# Patient Record
Sex: FEMALE | Race: WHITE | NOT HISPANIC OR LATINO | Employment: UNEMPLOYED | RURAL
[De-identification: names, ages, dates, MRNs, and addresses within clinical notes are randomized per-mention and may not be internally consistent; named-entity substitution may affect disease eponyms.]

---

## 2017-01-12 ENCOUNTER — OFFICE VISIT (OUTPATIENT)
Dept: RETAIL CLINIC | Facility: CLINIC | Age: 32
End: 2017-01-12

## 2017-01-12 VITALS
RESPIRATION RATE: 16 BRPM | WEIGHT: 161 LBS | HEIGHT: 67 IN | TEMPERATURE: 98.3 F | OXYGEN SATURATION: 97 % | SYSTOLIC BLOOD PRESSURE: 100 MMHG | DIASTOLIC BLOOD PRESSURE: 60 MMHG | BODY MASS INDEX: 25.27 KG/M2 | HEART RATE: 87 BPM

## 2017-01-12 DIAGNOSIS — J06.9 ACUTE URI: Primary | ICD-10-CM

## 2017-01-12 DIAGNOSIS — R06.2 WHEEZING: ICD-10-CM

## 2017-01-12 PROCEDURE — 99213 OFFICE O/P EST LOW 20 MIN: CPT | Performed by: NURSE PRACTITIONER

## 2017-01-12 RX ORDER — ALBUTEROL SULFATE 90 UG/1
2 AEROSOL, METERED RESPIRATORY (INHALATION) EVERY 6 HOURS PRN
Qty: 18 G | Refills: 0 | Status: SHIPPED | OUTPATIENT
Start: 2017-01-12

## 2017-01-12 NOTE — PROGRESS NOTES
"Subjective   Ameriac Maru Pride is a 31 y.o. female.     Wheezing    This is a chronic problem. Episode onset: has had cold symptoms x 1 week and wheezing has gotten worse. The problem occurs 2 to 4 times per day. The problem has been gradually worsening. Associated symptoms include coughing, rhinorrhea and shortness of breath. Pertinent negatives include no abdominal pain, chest pain, chills, coryza, diarrhea, ear pain, fever, headaches, hemoptysis, neck pain, rash, sore throat, sputum production, swollen glands or vomiting. The symptoms are aggravated by URIs. She has tried nothing (hx of asthma, but out of albuterol for some time.  Has appt for f/u with Dr. Cook next week) for the symptoms. Her past medical history is significant for asthma and COPD. smoker--quit last week        The following portions of the patient's history were reviewed and updated as appropriate: allergies, current medications, past medical history and past social history.    Review of Systems   Constitutional: Negative for activity change, appetite change, chills and fever.   HENT: Positive for congestion, postnasal drip, rhinorrhea and sneezing. Negative for ear pain, sinus pressure, sore throat and trouble swallowing.    Eyes: Negative.    Respiratory: Positive for cough, chest tightness, shortness of breath and wheezing. Negative for hemoptysis and sputum production.    Cardiovascular: Negative.  Negative for chest pain.   Gastrointestinal: Negative for abdominal pain, diarrhea, nausea and vomiting.   Musculoskeletal: Negative for myalgias, neck pain and neck stiffness.   Skin: Negative.  Negative for rash.   Neurological: Negative for dizziness and headaches.   Hematological: Negative for adenopathy.   Psychiatric/Behavioral: Negative.        Objective    Visit Vitals   • /60   • Pulse 87   • Temp 98.3 °F (36.8 °C) (Tympanic)   • Resp 16   • Ht 67\" (170.2 cm)   • Wt 161 lb (73 kg)   • SpO2 97%   • Breastfeeding No "   • BMI 25.22 kg/m2       Physical Exam   Constitutional: She is oriented to person, place, and time. She appears well-developed. No distress.   HENT:   Head: Normocephalic and atraumatic.   Right Ear: Tympanic membrane and ear canal normal.   Left Ear: Tympanic membrane and ear canal normal.   Nose: Mucosal edema ( mildly injected) present. Right sinus exhibits no maxillary sinus tenderness and no frontal sinus tenderness. Left sinus exhibits no maxillary sinus tenderness and no frontal sinus tenderness.   Mouth/Throat: Uvula is midline and mucous membranes are normal. Posterior oropharyngeal erythema ( mildly injected with clear PND) present.   Eyes: Conjunctivae are normal.   Neck: Normal range of motion. Neck supple.   Cardiovascular: Normal rate and regular rhythm.    Pulmonary/Chest: Effort normal. She has wheezes ( decreased aeration with inspiratory/expiratory wheezing).   Lymphadenopathy:     She has no cervical adenopathy.   Neurological: She is alert and oriented to person, place, and time.   Psychiatric: She has a normal mood and affect. Her behavior is normal.   Nursing note and vitals reviewed.      Assessment/Plan   America was seen today for med refill.    Diagnoses and all orders for this visit:    Acute URI    Wheezing  -     albuterol (PROVENTIL HFA;VENTOLIN HFA) 108 (90 BASE) MCG/ACT inhaler; Inhale 2 puffs Every 6 (Six) Hours As Needed for wheezing.    Push fluids  Rest  OTC cold meds as needed    Pulmonology as scheduled next week

## 2017-01-12 NOTE — PATIENT INSTRUCTIONS
Bronchospasm, Adult  A bronchospasm is when the tubes that carry air in and out of your lungs (airways) spasm or tighten. During a bronchospasm it is hard to breathe. This is because the airways get smaller. A bronchospasm can be triggered by:  · Allergies. These may be to animals, pollen, food, or mold.  · Infection. This is a common cause of bronchospasm.  · Exercise.  · Irritants. These include pollution, cigarette smoke, strong odors, aerosol sprays, and paint fumes.  · Weather changes.  · Stress.  · Being emotional.  HOME CARE   · Always have a plan for getting help. Know when to call your doctor and local emergency services (911 in the [place] Know where you can get emergency care.  · Only take medicines as told by your doctor.  · If you were prescribed an inhaler or nebulizer machine, ask your doctor how to use it correctly. Always use a spacer with your inhaler if you were given one.  · Stay calm during an attack. Try to relax and breathe more slowly.  · Control your home environment:    Change your heating and air conditioning filter at least once a month.    Limit your use of fireplaces and wood stoves.    Do not  smoke. Do not  allow smoking in your home.    Avoid perfumes and fragrances.    Get rid of pests (such as roaches and mice) and their droppings.    Throw away plants if you see mold on them.    Keep your house clean and dust free.    Replace carpet with wood, tile, or vinyl cheng. Carpet can trap dander and dust.    Use allergy-proof pillows, mattress covers, and box spring covers.    Wash bed sheets and blankets every week in hot water. Dry them in a dryer.    Use blankets that are made of polyester or cotton.    Wash hands frequently.  GET HELP IF:  · You have muscle aches.  · You have chest pain.  · The thick spit you spit or cough up (sputum) changes from clear or white to yellow, green, gray, or bloody.  · The thick spit you spit or cough up gets thicker.  · There are problems that may be  "related to the medicine you are given such as:    A rash.    Itching.    Swelling.    Trouble breathing.  GET HELP RIGHT AWAY IF:  · You feel you cannot breathe or catch your breath.  · You cannot stop coughing.  · Your treatment is not helping you breathe better.  · You have very bad chest pain.  MAKE SURE YOU:   · Understand these instructions.  · Will watch your condition.  · Will get help right away if you are not doing well or get worse.     This information is not intended to replace advice given to you by your health care provider. Make sure you discuss any questions you have with your health care provider.     Document Released: 10/15/2010 Document Revised: 01/08/2016 Document Reviewed: 06/10/2014  [employer] Interactive Patient Education ©2016 [employer]  Upper Respiratory Infection, Adult  Most upper respiratory infections (URIs) are a viral infection of the air passages leading to the lungs. A URI affects the nose, throat, and upper air passages. The most common type of URI is nasopharyngitis and is typically referred to as \"the common cold.\"  URIs run their course and usually go away on their own. Most of the time, a URI does not require medical attention, but sometimes a bacterial infection in the upper airways can follow a viral infection. This is called a secondary infection. Sinus and middle ear infections are common types of secondary upper respiratory infections.  Bacterial pneumonia can also complicate a URI. A URI can worsen asthma and chronic obstructive pulmonary disease (COPD). Sometimes, these complications can require emergency medical care and may be life threatening.   CAUSES  Almost all URIs are caused by viruses. A virus is a type of germ and can spread from one person to another.   RISKS FACTORS  You may be at risk for a URI if:   · You smoke.    · You have chronic heart or lung disease.  · You have a weakened defense (immune) system.    · You are very young or very old.    · You have " nasal allergies or asthma.  · You work in crowded or poorly ventilated areas.  · You work in health care facilities or schools.  SIGNS AND SYMPTOMS   Symptoms typically develop 2-3 days after you come in contact with a cold virus. Most viral URIs last 7-10 days. However, viral URIs from the influenza virus (flu virus) can last 14-18 days and are typically more severe. Symptoms may include:   · Runny or stuffy (congested) nose.    · Sneezing.    · Cough.    · Sore throat.    · Headache.    · Fatigue.    · Fever.    · Loss of appetite.    · Pain in your forehead, behind your eyes, and over your cheekbones (sinus pain).  · Muscle aches.    DIAGNOSIS   Your health care provider may diagnose a URI by:  · Physical exam.  · Tests to check that your symptoms are not due to another condition such as:  ¨ Strep throat.  ¨ Sinusitis.  ¨ Pneumonia.  ¨ Asthma.  TREATMENT   A URI goes away on its own with time. It cannot be cured with medicines, but medicines may be prescribed or recommended to relieve symptoms. Medicines may help:  · Reduce your fever.  · Reduce your cough.  · Relieve nasal congestion.  HOME CARE INSTRUCTIONS   · Take medicines only as directed by your health care provider.    · Gargle warm saltwater or take cough drops to comfort your throat as directed by your health care provider.  · Use a warm mist humidifier or inhale steam from a shower to increase air moisture. This may make it easier to breathe.  · Drink enough fluid to keep your urine clear or pale yellow.    · Eat soups and other clear broths and maintain good nutrition.    · Rest as needed.    · Return to work when your temperature has returned to normal or as your health care provider advises. You may need to stay home longer to avoid infecting others. You can also use a face mask and careful hand washing to prevent spread of the virus.  · Increase the usage of your inhaler if you have asthma.    · Do not use any tobacco products, including  cigarettes, chewing tobacco, or electronic cigarettes. If you need help quitting, ask your health care provider.  PREVENTION   The best way to protect yourself from getting a cold is to practice good hygiene.   · Avoid oral or hand contact with people with cold symptoms.    · Wash your hands often if contact occurs.    There is no clear evidence that vitamin C, vitamin E, echinacea, or exercise reduces the chance of developing a cold. However, it is always recommended to get plenty of rest, exercise, and practice good nutrition.   SEEK MEDICAL CARE IF:   · You are getting worse rather than better.    · Your symptoms are not controlled by medicine.    · You have chills.  · You have worsening shortness of breath.  · You have brown or red mucus.  · You have yellow or brown nasal discharge.  · You have pain in your face, especially when you bend forward.  · You have a fever.  · You have swollen neck glands.  · You have pain while swallowing.  · You have white areas in the back of your throat.  SEEK IMMEDIATE MEDICAL CARE IF:   · You have severe or persistent:    Headache.    Ear pain.    Sinus pain.    Chest pain.  · You have chronic lung disease and any of the following:    Wheezing.    Prolonged cough.    Coughing up blood.    A change in your usual mucus.  · You have a stiff neck.  · You have changes in your:    Vision.    Hearing.    Thinking.    Mood.  MAKE SURE YOU:   · Understand these instructions.  · Will watch your condition.  · Will get help right away if you are not doing well or get worse.     This information is not intended to replace advice given to you by your health care provider. Make sure you discuss any questions you have with your health care provider.     Document Released: 06/13/2002 Document Revised: 05/03/2016 Document Reviewed: 03/25/2015  [employer] Interactive Patient Education ©2016 [employer]

## 2017-01-12 NOTE — MR AVS SNAPSHOT
America Pride   1/12/2017 12:00 PM   Office Visit    Dept Phone:  [phone]   Encounter #:  [id number]    Provider:  AXEL BRIGGS [place]   Department:  [facility]                Your Full Care Plan              Today's Medication Changes          These changes are accurate as of: 1/12/17  1:03 PM.  If you have any questions, ask your nurse or doctor.               New Medication(s)Ordered:     albuterol 108 (90 BASE) MCG/ACT inhaler   Commonly known as:  PROVENTIL HFA;VENTOLIN HFA   Inhale 2 puffs Every 6 (Six) Hours As Needed for wheezing.            Where to Get Your Medications      These medications were sent to [facility] - [place], [place] - [address] [place] [address] - [phone]  - [phone] [address], [facility] [address]     Phone:  [phone]     albuterol 108 (90 BASE) MCG/ACT inhaler                  Your Updated Medication List          This list is accurate as of: 1/12/17  1:03 PM.  Always use your most recent med list.                albuterol 108 (90 BASE) MCG/ACT inhaler   Commonly known as:  PROVENTIL HFA;VENTOLIN HFA   Inhale 2 puffs Every 6 (Six) Hours As Needed for wheezing.       NEURONTIN PO       SEROQUEL PO       STRATTERA PO               You Were Diagnosed With        Codes Comments    Acute URI    -  Primary ICD-10-CM: J06.9  ICD-9-CM: 465.9     Wheezing     ICD-10-CM: R06.2  ICD-9-CM: 786.07       Instructions    Bronchospasm, Adult  A bronchospasm is when the tubes that carry air in and out of your lungs (airways) spasm or tighten. During a bronchospasm it is hard to breathe. This is because the airways get smaller. A bronchospasm can be triggered by:  · Allergies. These may be to animals, pollen, food, or mold.  · Infection. This is a common cause of bronchospasm.  · Exercise.  · Irritants. These include pollution, cigarette smoke, strong odors, aerosol sprays, and paint  fumes.  · Weather changes.  · Stress.  · Being emotional.  HOME CARE   · Always have a plan for getting help. Know when to call your doctor and local emergency services (911 in the [place] Know where you can get emergency care.  · Only take medicines as told by your doctor.  · If you were prescribed an inhaler or nebulizer machine, ask your doctor how to use it correctly. Always use a spacer with your inhaler if you were given one.  · Stay calm during an attack. Try to relax and breathe more slowly.  · Control your home environment:    Change your heating and air conditioning filter at least once a month.    Limit your use of fireplaces and wood stoves.    Do not  smoke. Do not  allow smoking in your home.    Avoid perfumes and fragrances.    Get rid of pests (such as roaches and mice) and their droppings.    Throw away plants if you see mold on them.    Keep your house clean and dust free.    Replace carpet with wood, tile, or vinyl cheng. Carpet can trap dander and dust.    Use allergy-proof pillows, mattress covers, and box spring covers.    Wash bed sheets and blankets every week in hot water. Dry them in a dryer.    Use blankets that are made of polyester or cotton.    Wash hands frequently.  GET HELP IF:  · You have muscle aches.  · You have chest pain.  · The thick spit you spit or cough up (sputum) changes from clear or white to yellow, green, gray, or bloody.  · The thick spit you spit or cough up gets thicker.  · There are problems that may be related to the medicine you are given such as:    A rash.    Itching.    Swelling.    Trouble breathing.  GET HELP RIGHT AWAY IF:  · You feel you cannot breathe or catch your breath.  · You cannot stop coughing.  · Your treatment is not helping you breathe better.  · You have very bad chest pain.  MAKE SURE YOU:   · Understand these instructions.  · Will watch your condition.  · Will get help right away if you are not doing well or get worse.     This information  "is not intended to replace advice given to you by your health care provider. Make sure you discuss any questions you have with your health care provider.     Document Released: 10/15/2010 Document Revised: 01/08/2016 Document Reviewed: 06/10/2014  [employer] Interactive Patient Education ©2016 [employer]  Upper Respiratory Infection, Adult  Most upper respiratory infections (URIs) are a viral infection of the air passages leading to the lungs. A URI affects the nose, throat, and upper air passages. The most common type of URI is nasopharyngitis and is typically referred to as \"the common cold.\"  URIs run their course and usually go away on their own. Most of the time, a URI does not require medical attention, but sometimes a bacterial infection in the upper airways can follow a viral infection. This is called a secondary infection. Sinus and middle ear infections are common types of secondary upper respiratory infections.  Bacterial pneumonia can also complicate a URI. A URI can worsen asthma and chronic obstructive pulmonary disease (COPD). Sometimes, these complications can require emergency medical care and may be life threatening.   CAUSES  Almost all URIs are caused by viruses. A virus is a type of germ and can spread from one person to another.   RISKS FACTORS  You may be at risk for a URI if:   · You smoke.    · You have chronic heart or lung disease.  · You have a weakened defense (immune) system.    · You are very young or very old.    · You have nasal allergies or asthma.  · You work in crowded or poorly ventilated areas.  · You work in health care facilities or schools.  SIGNS AND SYMPTOMS   Symptoms typically develop 2-3 days after you come in contact with a cold virus. Most viral URIs last 7-10 days. However, viral URIs from the influenza virus (flu virus) can last 14-18 days and are typically more severe. Symptoms may include:   · Runny or stuffy (congested) nose.    · Sneezing.    · Cough.    · Sore " throat.    · Headache.    · Fatigue.    · Fever.    · Loss of appetite.    · Pain in your forehead, behind your eyes, and over your cheekbones (sinus pain).  · Muscle aches.    DIAGNOSIS   Your health care provider may diagnose a URI by:  · Physical exam.  · Tests to check that your symptoms are not due to another condition such as:  ¨ Strep throat.  ¨ Sinusitis.  ¨ Pneumonia.  ¨ Asthma.  TREATMENT   A URI goes away on its own with time. It cannot be cured with medicines, but medicines may be prescribed or recommended to relieve symptoms. Medicines may help:  · Reduce your fever.  · Reduce your cough.  · Relieve nasal congestion.  HOME CARE INSTRUCTIONS   · Take medicines only as directed by your health care provider.    · Gargle warm saltwater or take cough drops to comfort your throat as directed by your health care provider.  · Use a warm mist humidifier or inhale steam from a shower to increase air moisture. This may make it easier to breathe.  · Drink enough fluid to keep your urine clear or pale yellow.    · Eat soups and other clear broths and maintain good nutrition.    · Rest as needed.    · Return to work when your temperature has returned to normal or as your health care provider advises. You may need to stay home longer to avoid infecting others. You can also use a face mask and careful hand washing to prevent spread of the virus.  · Increase the usage of your inhaler if you have asthma.    · Do not use any tobacco products, including cigarettes, chewing tobacco, or electronic cigarettes. If you need help quitting, ask your health care provider.  PREVENTION   The best way to protect yourself from getting a cold is to practice good hygiene.   · Avoid oral or hand contact with people with cold symptoms.    · Wash your hands often if contact occurs.    There is no clear evidence that vitamin C, vitamin E, echinacea, or exercise reduces the chance of developing a cold. However, it is always recommended to  get plenty of rest, exercise, and practice good nutrition.   SEEK MEDICAL CARE IF:   · You are getting worse rather than better.    · Your symptoms are not controlled by medicine.    · You have chills.  · You have worsening shortness of breath.  · You have brown or red mucus.  · You have yellow or brown nasal discharge.  · You have pain in your face, especially when you bend forward.  · You have a fever.  · You have swollen neck glands.  · You have pain while swallowing.  · You have white areas in the back of your throat.  SEEK IMMEDIATE MEDICAL CARE IF:   · You have severe or persistent:    Headache.    Ear pain.    Sinus pain.    Chest pain.  · You have chronic lung disease and any of the following:    Wheezing.    Prolonged cough.    Coughing up blood.    A change in your usual mucus.  · You have a stiff neck.  · You have changes in your:    Vision.    Hearing.    Thinking.    Mood.  MAKE SURE YOU:   · Understand these instructions.  · Will watch your condition.  · Will get help right away if you are not doing well or get worse.     This information is not intended to replace advice given to you by your health care provider. Make sure you discuss any questions you have with your health care provider.     Document Released: 06/13/2002 Document Revised: 05/03/2016 Document Reviewed: 03/25/2015  [employer] Interactive Patient Education ©2016 [employer]       Patient Instructions History      Upcoming Appointments     Visit Type Date Time Department    OFFICE VISIT 1/12/2017 12:00 PM [facility]      [employer] Signup     [facility] [employer] allows you to send messages to your doctor, view your test results, renew your prescriptions, schedule appointments, and more. To sign up, go to [employer] and click on the Sign Up Now link in the New User? box. Enter your [employer] Activation Code exactly as it appears below along with the last four digits of your Social Security Number and your Date of  "Birth () to complete the sign-up process. If you do not sign up before the expiration date, you must request a new code.    [employer] Activation Code: [email or web address]  Expires: 2017  1:03 PM    If you have questions, you can email [email or web address] or call [phone] to talk to our [employer] staff. Remember, [employer] is NOT to be used for urgent needs. For medical emergencies, dial 911.               Other Info from Your Visit           Allergies     No Known Allergies      Reason for Visit     Med Refill skin itchy x 1 week       Vital Signs     Blood Pressure Pulse Temperature Respirations Height Weight    100/60 87 98.3 °F (36.8 °C) (Tympanic) 16 67\" (170.2 cm) 161 lb (73 kg)    Oxygen Saturation Breastfeeding? Body Mass Index Smoking Status          97% No 25.22 kg/m2 Former Smoker        Problems and Diagnoses Noted     Acute upper respiratory infection    -  Primary    Wheezing            "

## 2018-02-08 DIAGNOSIS — R06.2 WHEEZING: ICD-10-CM

## 2018-02-08 RX ORDER — ALBUTEROL SULFATE 90 UG/1
AEROSOL, METERED RESPIRATORY (INHALATION)
Qty: 18 G | Refills: 0 | OUTPATIENT
Start: 2018-02-08

## 2020-12-21 ENCOUNTER — HOSPITAL ENCOUNTER (INPATIENT)
Facility: HOSPITAL | Age: 35
LOS: 4 days | Discharge: HOME | End: 2020-12-25
Attending: PSYCHIATRY & NEUROLOGY | Admitting: PSYCHIATRY & NEUROLOGY

## 2020-12-21 DIAGNOSIS — F90.2 ATTENTION DEFICIT HYPERACTIVITY DISORDER (ADHD), COMBINED TYPE: Primary | ICD-10-CM

## 2020-12-21 PROBLEM — F32.A DEPRESSION: Status: ACTIVE | Noted: 2020-12-21

## 2020-12-21 PROBLEM — F33.3 SEVERE EPISODE OF RECURRENT MAJOR DEPRESSIVE DISORDER, WITH PSYCHOTIC FEATURES (HCC): Status: ACTIVE | Noted: 2020-12-21

## 2020-12-21 PROBLEM — F43.10 POST TRAUMATIC STRESS DISORDER (PTSD): Status: ACTIVE | Noted: 2020-12-21

## 2020-12-21 LAB
CHOLEST SERPL-MCNC: 212 MG/DL (ref 0–200)
GLUCOSE P FAST SERPL-MCNC: 100 MG/DL (ref 74–106)
HDLC SERPL-MCNC: 35 MG/DL (ref 40–60)
LDLC SERPL CALC-MCNC: 159 MG/DL (ref 0–100)
LDLC/HDLC SERPL: 4.5 {RATIO}
TRIGL SERPL-MCNC: 98 MG/DL (ref 0–150)
VLDLC SERPL-MCNC: 18 MG/DL (ref 5–40)

## 2020-12-21 PROCEDURE — 82947 ASSAY GLUCOSE BLOOD QUANT: CPT | Performed by: PSYCHIATRY & NEUROLOGY

## 2020-12-21 PROCEDURE — 99223 1ST HOSP IP/OBS HIGH 75: CPT | Performed by: PSYCHIATRY & NEUROLOGY

## 2020-12-21 PROCEDURE — 93010 ELECTROCARDIOGRAM REPORT: CPT | Performed by: INTERNAL MEDICINE

## 2020-12-21 PROCEDURE — 93005 ELECTROCARDIOGRAM TRACING: CPT | Performed by: PSYCHIATRY & NEUROLOGY

## 2020-12-21 PROCEDURE — 80061 LIPID PANEL: CPT | Performed by: PSYCHIATRY & NEUROLOGY

## 2020-12-21 RX ORDER — TRAZODONE HYDROCHLORIDE 50 MG/1
50 TABLET ORAL NIGHTLY PRN
Status: DISCONTINUED | OUTPATIENT
Start: 2020-12-21 | End: 2020-12-26 | Stop reason: HOSPADM

## 2020-12-21 RX ORDER — TOPIRAMATE 50 MG/1
50 TABLET, FILM COATED ORAL DAILY
COMMUNITY
End: 2020-12-25 | Stop reason: HOSPADM

## 2020-12-21 RX ORDER — DIAZEPAM 5 MG/1
10 TABLET ORAL EVERY 8 HOURS PRN
COMMUNITY
End: 2020-12-25 | Stop reason: HOSPADM

## 2020-12-21 RX ORDER — ACETAMINOPHEN 325 MG/1
650 TABLET ORAL EVERY 4 HOURS PRN
Status: DISCONTINUED | OUTPATIENT
Start: 2020-12-21 | End: 2020-12-26 | Stop reason: HOSPADM

## 2020-12-21 RX ORDER — PRAZOSIN HYDROCHLORIDE 1 MG/1
2 CAPSULE ORAL NIGHTLY
Status: DISCONTINUED | OUTPATIENT
Start: 2020-12-21 | End: 2020-12-23

## 2020-12-21 RX ORDER — IBUPROFEN 800 MG/1
800 TABLET ORAL EVERY 6 HOURS PRN
COMMUNITY
End: 2020-12-25 | Stop reason: HOSPADM

## 2020-12-21 RX ORDER — HYDROXYZINE PAMOATE 50 MG/1
50 CAPSULE ORAL EVERY 6 HOURS PRN
Status: DISCONTINUED | OUTPATIENT
Start: 2020-12-21 | End: 2020-12-26 | Stop reason: HOSPADM

## 2020-12-21 RX ORDER — OLANZAPINE 5 MG/1
10 TABLET, ORALLY DISINTEGRATING ORAL EVERY 8 HOURS PRN
Status: DISCONTINUED | OUTPATIENT
Start: 2020-12-21 | End: 2020-12-21

## 2020-12-21 RX ORDER — BUPROPION HYDROCHLORIDE 150 MG/1
300 TABLET ORAL DAILY
COMMUNITY
End: 2020-12-25 | Stop reason: HOSPADM

## 2020-12-21 RX ORDER — ONDANSETRON 4 MG/1
4 TABLET, FILM COATED ORAL EVERY 6 HOURS PRN
Status: DISCONTINUED | OUTPATIENT
Start: 2020-12-21 | End: 2020-12-26 | Stop reason: HOSPADM

## 2020-12-21 RX ORDER — LOPERAMIDE HYDROCHLORIDE 2 MG/1
2 CAPSULE ORAL
Status: DISCONTINUED | OUTPATIENT
Start: 2020-12-21 | End: 2020-12-26 | Stop reason: HOSPADM

## 2020-12-21 RX ORDER — GUAIFENESIN 600 MG/1
600 TABLET, EXTENDED RELEASE ORAL 2 TIMES DAILY
COMMUNITY
End: 2020-12-25 | Stop reason: HOSPADM

## 2020-12-21 RX ORDER — OLANZAPINE 5 MG/1
10 TABLET, ORALLY DISINTEGRATING ORAL EVERY 8 HOURS PRN
Status: DISCONTINUED | OUTPATIENT
Start: 2020-12-21 | End: 2020-12-26 | Stop reason: HOSPADM

## 2020-12-21 RX ORDER — PRAZOSIN HYDROCHLORIDE 5 MG/1
5 CAPSULE ORAL NIGHTLY
COMMUNITY
End: 2020-12-25 | Stop reason: HOSPADM

## 2020-12-21 RX ORDER — CLONIDINE HYDROCHLORIDE 0.1 MG/1
0.1 TABLET ORAL EVERY 4 HOURS PRN
Status: DISCONTINUED | OUTPATIENT
Start: 2020-12-21 | End: 2020-12-26 | Stop reason: HOSPADM

## 2020-12-21 RX ORDER — ALBUTEROL SULFATE 2.5 MG/3ML
2.5 SOLUTION RESPIRATORY (INHALATION) EVERY 6 HOURS PRN
Status: DISCONTINUED | OUTPATIENT
Start: 2020-12-21 | End: 2020-12-26 | Stop reason: HOSPADM

## 2020-12-21 RX ORDER — TRAZODONE HYDROCHLORIDE 100 MG/1
200 TABLET ORAL NIGHTLY
COMMUNITY
End: 2020-12-25 | Stop reason: HOSPADM

## 2020-12-21 RX ORDER — ALUMINA, MAGNESIA, AND SIMETHICONE 2400; 2400; 240 MG/30ML; MG/30ML; MG/30ML
15 SUSPENSION ORAL EVERY 6 HOURS PRN
Status: DISCONTINUED | OUTPATIENT
Start: 2020-12-21 | End: 2020-12-26 | Stop reason: HOSPADM

## 2020-12-21 RX ORDER — HALOPERIDOL 5 MG/ML
5 INJECTION INTRAMUSCULAR EVERY 8 HOURS PRN
Status: DISCONTINUED | OUTPATIENT
Start: 2020-12-21 | End: 2020-12-21

## 2020-12-21 RX ORDER — HALOPERIDOL 5 MG/ML
5 INJECTION INTRAMUSCULAR EVERY 8 HOURS PRN
Status: DISCONTINUED | OUTPATIENT
Start: 2020-12-21 | End: 2020-12-26 | Stop reason: HOSPADM

## 2020-12-21 RX ORDER — MONTELUKAST SODIUM 10 MG/1
10 TABLET ORAL NIGHTLY
COMMUNITY
End: 2020-12-25 | Stop reason: HOSPADM

## 2020-12-21 RX ADMIN — OLANZAPINE 7.5 MG: 2.5 TABLET, FILM COATED ORAL at 20:19

## 2020-12-21 RX ADMIN — SERTRALINE HYDROCHLORIDE 50 MG: 50 TABLET ORAL at 15:11

## 2020-12-21 RX ADMIN — PRAZOSIN HYDROCHLORIDE 2 MG: 1 CAPSULE ORAL at 20:19

## 2020-12-21 NOTE — PLAN OF CARE
Goal Outcome Evaluation:  Plan of Care Reviewed With: patient  Progress: no change  Outcome Summary: Patient did not talk much or express her thoughts.  Patient is quiet

## 2020-12-21 NOTE — NURSING NOTE
Behavior   Note any precipitants to event or behavior   Describe level and action of any aggressive behavior or speech and associated interventions.     Anxiety: Excess Worry, Restless/Edgy and Easily fatigued  Depression: depressed mood and hopelessness  Pain  0  AVH   no  S/I   no  Plan  no  H/I   no  Plan  no    Affect   blunted      Note:Patient stated that she feels very depressed, hopeless and helpless.  She is very quiet and withdrawn.  Denies SI/HI and hallucis.        Intervention    PRN medication utilized:  no    Instructed in medication usage and effects  Medications administered as ordered  Encouraged to verbalize needs      Response    Verbalized understanding   Did patient take medications as ordered yes   Did patient interact with assessment?  yes     Plan    Will monitor for safety  Will monitor every 15 minutes as ordered  Will evaluate and promote the plan of care    Last BM:  unknown date  (Please chart in I/O as well)
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
## 2020-12-21 NOTE — NURSING NOTE
Patient admitted to [facility] at 0032. Patient direct admit from [facility]. Patient transported by EMS. Patient wanded at door, no contraband found. Patient changed into paper scrubs. Patient signed all admission paperwork.     Patient arrived to ED c/o severe depression and anxiety. Patient also complains of feelings of hopelessness and helplessness. Patient is non compliant with meds, especially her valium. Denies SI/HI/AVH.

## 2020-12-21 NOTE — CONSULTS
[facility] Medicine Admission      Date of Admission: 12/21/2020      Primary Care Physician: Barbara Calderon Known      Chief Complaint: No complaints    HPI: This is a 35-year-old female with past medical history of ADHD, anxiety, asthma and COPD that presented to [facility] on 12/21/2020 with complaints of depression.  Patient denies fever, chills or cough.  She states she has episodes of shortness of air secondary to her COPD.    Concurrent Medical History:  has a past medical history of ADHD (attention deficit hyperactivity disorder), Anxiety, Asthma, and COPD (chronic obstructive pulmonary disease) (CMS/[facility]).    Past Surgical History:  has no past surgical history on file.    Family History: family history includes Diabetes in her father, maternal grandfather, maternal grandmother, paternal grandfather, and paternal grandmother; Heart disease in her mother.    Social History:  reports that she has been smoking cigarettes. She does not have any smokeless tobacco history on file.    Allergies: No Known Allergies    Medications:   Prior to Admission medications    Medication Sig Start Date End Date Taking? Authorizing Provider   albuterol (PROVENTIL HFA;VENTOLIN HFA) 108 (90 BASE) MCG/ACT inhaler Inhale 2 puffs Every 6 (Six) Hours As Needed for wheezing. 1/12/17  Yes Helder Esparza APRN   buPROPion XL (WELLBUTRIN XL) 150 MG 24 hr tablet Take 300 mg by mouth Daily.   Yes Christine Calderon MD   diazePAM (VALIUM) 5 MG tablet Take 10 mg by mouth Every 8 (Eight) Hours As Needed for Anxiety.   Yes Christine Calderon MD   fluticasone-salmeterol (Advair Diskus) 250-50 MCG/DOSE DISKUS Inhale 1 puff 2 (Two) Times a Day.   Yes Christine Calderon MD   guaiFENesin (MUCINEX) 600 MG 12 hr tablet Take 600 mg by mouth 2 (Two) Times a Day.   Yes Christine Calderon MD   ibuprofen (ADVIL,MOTRIN) 800 MG tablet Take 800 mg by mouth Every 6 (Six) Hours As Needed for Mild Pain .    Yes Christine Calderon MD   montelukast (SINGULAIR) 10 MG tablet Take 10 mg by mouth Every Night.   Yes Christine Calderon MD   prazosin (MINIPRESS) 5 MG capsule Take 5 mg by mouth Every Night.   Yes Christine Calderon MD   topiramate (TOPAMAX) 50 MG tablet Take 50 mg by mouth Daily.   Yes Christine Calderon MD   traZODone (DESYREL) 100 MG tablet Take 200 mg by mouth Every Night.   Yes Christine Calderon MD   Atomoxetine HCl (STRATTERA PO) Take  by mouth.    Christine Calderon MD   Gabapentin (NEURONTIN PO) Take  by mouth.    Christine Calderon MD   QUEtiapine Fumarate (SEROQUEL PO) Take  by mouth.    Christine Calderon MD       Review of Systems:  Review of Systems   Constitutional: Negative for activity change and fatigue.   HENT: Negative for ear pain and sore throat.    Eyes: Negative for pain and discharge.   Respiratory: Negative for cough and shortness of breath.    Cardiovascular: Negative for chest pain and palpitations.   Gastrointestinal: Negative for abdominal pain and nausea.   Endocrine: Negative for cold intolerance and heat intolerance.   Genitourinary: Negative for difficulty urinating and dysuria.   Musculoskeletal: Negative for arthralgias and gait problem.   Skin: Negative for color change and rash.   Neurological: Negative for dizziness and weakness.   Psychiatric/Behavioral: Negative for agitation and confusion.      Otherwise complete ROS is negative except as mentioned above.    Physical Exam:   Temp:  [97.3 °F (36.3 °C)-97.5 °F (36.4 °C)] 97.3 °F (36.3 °C)  Heart Rate:  [67-78] 67  Resp:  [16-18] 18  BP: (114-126)/(78-82) 114/78  Physical Exam  Constitutional:       Appearance: She is well-developed.   HENT:      Head: Normocephalic and atraumatic.   Eyes:      Pupils: Pupils are equal, round, and reactive to light.   Neck:      Musculoskeletal: Normal range of motion and neck supple.   Cardiovascular:      Rate and Rhythm: Normal rate and regular rhythm.    Pulmonary:      Effort: Pulmonary effort is normal.      Breath sounds: Normal breath sounds.   Abdominal:      General: Bowel sounds are normal.      Palpations: Abdomen is soft.   Musculoskeletal: Normal range of motion.   Skin:     General: Skin is warm and dry.   Neurological:      Mental Status: She is alert and oriented to person, place, and time.   Psychiatric:         Behavior: Behavior normal.       CN I: Sense of smell intact  CN II: Visual fields intact  CN III,IV,VI: extraocular movements intact  CN V: Masseter strength and sensation in all three divisions intact  CN VII: Smile and eyelid closure symmetrical  CN VIII: Hearing intact  CN IX and X: Voice and palate movement intact  CN XI: Shoulder shrug intact  CN XII: Tongue protrusion and movement intact      Results Reviewed:  I have personally reviewed current lab, radiology, and data and agree with results.  Lab Results (last 24 hours)     Procedure Component Value Units Date/Time    Glucose, Fasting [[id number]]  (Normal) Collected: 12/21/20 0531    Specimen: Blood Updated: 12/21/20 0653     Glucose, Fasting 100 mg/dL     Lipid Panel [[id number]]  (Abnormal) Collected: 12/21/20 0531    Specimen: Blood Updated: 12/21/20 0653     Total Cholesterol 212 mg/dL      Triglycerides 98 mg/dL      HDL Cholesterol 35 mg/dL      LDL Cholesterol  159 mg/dL      VLDL Cholesterol 18 mg/dL      LDL/HDL Ratio 4.50    Narrative:      Cholesterol Reference Ranges  (U.S. Department of Health and Human Services ATP III Classifications)    Desirable          <200 mg/dL  Borderline High    200-239 mg/dL  High Risk          >240 mg/dL      Triglyceride Reference Ranges  (U.S. Department of Health and Human Services ATP III Classifications)    Normal           <150 mg/dL  Borderline High  150-199 mg/dL  High             200-499 mg/dL  Very High        >500 mg/dL    HDL Reference Ranges  (U.S. Department of Health and Human Services ATP III Classifcations)    Low     <40  mg/dl (major risk factor for CHD)  High    >60 mg/dl ('negative' risk factor for CHD)        LDL Reference Ranges  (U.S. Department of Health and Human Services ATP III Classifcations)    Optimal          <100 mg/dL  Near Optimal     100-129 mg/dL  Borderline High  130-159 mg/dL  High             160-189 mg/dL  Very High        >189 mg/dL        Imaging Results (Last 24 Hours)     ** No results found for the last 24 hours. **            Assessment:        Active Hospital Problems    Diagnosis POA   • **Severe episode of recurrent major depressive disorder, with psychotic features (CMS/HCC) [F33.3] Yes   • Post traumatic stress disorder (PTSD) [F43.10] Unknown         Plan:  1.  Depression/PTSD: The patient will continue to follow with psychiatry.  2.  COPD: Does not appear in acute exacerbation.  Albuterol nebulizer as needed.    I discussed the patient's findings and my recommendations with: Patient    We will sign off here.  Please contact the hospitalist services for any further issues.      This document has been electronically signed by MIKE Zhu on December 21, 2020 15:51 CST

## 2020-12-22 PROCEDURE — 99232 SBSQ HOSP IP/OBS MODERATE 35: CPT | Performed by: NURSE PRACTITIONER

## 2020-12-22 PROCEDURE — 90833 PSYTX W PT W E/M 30 MIN: CPT | Performed by: NURSE PRACTITIONER

## 2020-12-22 RX ADMIN — SERTRALINE HYDROCHLORIDE 100 MG: 50 TABLET ORAL at 08:51

## 2020-12-22 RX ADMIN — ACETAMINOPHEN 650 MG: 325 TABLET, FILM COATED ORAL at 14:00

## 2020-12-22 RX ADMIN — OLANZAPINE 7.5 MG: 2.5 TABLET, FILM COATED ORAL at 20:07

## 2020-12-22 RX ADMIN — PRAZOSIN HYDROCHLORIDE 2 MG: 1 CAPSULE ORAL at 20:07

## 2020-12-22 NOTE — PROGRESS NOTES
"12/22/2020    Chief Complaint: depression    Subjective:  Patient is a 35 y.o. female that is currently inpatient on the adult U today she is seen in the conference room. When asked what name preference pt responded \"I don't care what you call me\" and walked past.  She sat in chair with legs up to chest, and at first gave short/magui answer.   Pt does become less tense and begin to open up more, she describes her life in chaos right now, states she doesn't know what to do, feels family has given up on her and is tired of her always being \"needy\"  Pt does not answer when asked if parents are alive she states \"I don't even know that is something I was to talk about\"  When asked about sister she says she isn't supportive but then she has pts ID's and other important papers she would need.   When asked about a job pt states \"that's a trick question, you'll think I'm nuts if I answer either way\"   She then reports she has a car that she needs to make a payment on the 25th but then states she doesn't know if it even runs.   Pt continues to remain negative about everything in her life at this time, children included.   Pt is encouraged to work on short term goals, she then expresses need for housing.   Pt did report she slept well last night, no SI/HI/AVH.  No issues with medications    Objective     Vital Signs    Temp:  [97 °F (36.1 °C)-98.3 °F (36.8 °C)] 97 °F (36.1 °C)  Heart Rate:  [78] 78  Resp:  [18-20] 18  BP: (106-130)/(63-78) 130/63    Physical Exam:   General Appearance: alert, appears stated age and cooperative,  Hygiene:   fair  Gait & Station: Normal  Musculoskeletal: No tremors or abnormal involuntary movements    Mental Status Exam:   Cooperation:  Evasive  Eye Contact:  Fair  Psychomotor Behavior:  Appropriate  Mood: Irritable  Affect:  constricted  Speech:  Normal  Thought Process:  [place]  Associations: Tangential  Thought Content:     Mood congruent   Suicidal:  None   Homicidal:  " None   Hallucinations:  None   Delusion:  Paranoid  Cognitive Functioning:   Consciousness: awake, alert and oriented  Reliability:  poor  Insight:  Poor  Judgement:  Poor  Impulse Control:  Fair    Lab Results (last 24 hours)     ** No results found for the last 24 hours. **        Imaging Results (Last 24 Hours)     ** No results found for the last 24 hours. **          Medicine:   Current Facility-Administered Medications:   •  acetaminophen (TYLENOL) tablet 650 mg, 650 mg, Oral, Q4H PRN, Prema Horn MD  •  albuterol (PROVENTIL) nebulizer solution 0.083% 2.5 mg/3mL, 2.5 mg, Nebulization, Q6H PRN, Prema Horn MD  •  aluminum-magnesium hydroxide-simethicone (MAALOX MAX) 400-400-40 MG/5ML suspension 15 mL, 15 mL, Oral, Q6H PRN, Prema Horn MD  •  cloNIDine (CATAPRES) tablet 0.1 mg, 0.1 mg, Oral, Q4H PRN, Prema Horn MD  •  OLANZapine zydis (zyPREXA) disintegrating tablet 10 mg, 10 mg, Oral, Q8H PRN **OR** haloperidol lactate (HALDOL) injection 5 mg, 5 mg, Intramuscular, Q8H PRN, Prema Horn MD  •  hydrOXYzine pamoate (VISTARIL) capsule 50 mg, 50 mg, Oral, Q6H PRN, Prema Horn MD  •  loperamide (IMODIUM) capsule 2 mg, 2 mg, Oral, Q2H PRN, Prema Horn MD  •  magnesium hydroxide (MILK OF MAGNESIA) suspension 2400 mg/10mL 10 mL, 10 mL, Oral, Daily PRN, Prema Horn MD  •  nicotine polacrilex (NICORETTE) gum 2 mg, 2 mg, Mouth/Throat, Q1H PRN, Prema Horn MD  •  OLANZapine (zyPREXA) tablet 7.5 mg, 7.5 mg, Oral, Nightly, Prema Horn MD, 7.5 mg at 12/21/20 2019  •  ondansetron (ZOFRAN) tablet 4 mg, 4 mg, Oral, Q6H PRN, Prema Horn MD  •  prazosin (MINIPRESS) capsule 2 mg, 2 mg, Oral, Nightly, Prema Horn MD, 2 mg at 12/21/20 2019  •  [COMPLETED] sertraline (ZOLOFT) tablet 50 mg, 50 mg, Oral, Daily, 50 mg at 12/21/20 1511 **FOLLOWED BY** sertraline (ZOLOFT) tablet 100 mg, 100 mg, Oral, Daily, Prema Horn MD, 100 mg at  12/22/20 0851  •  traZODone (DESYREL) tablet 50 mg, 50 mg, Oral, Nightly Kory GIBSON Shabeer, MD    Diagnoses/Assessment:     Severe episode of recurrent major depressive disorder, with psychotic features (CMS/HCC)    Post traumatic stress disorder (PTSD)      Treatment Plan:    1) Will continue care for the patient on the behavioral health unit at [facility] to ensure patient safety.  2) Will continue to provide treatment with the unit milieu, activities, therapies and psychopharmacological management.  3) Patient to be placed on or continued on  Q15 minute checks  and Suicide and Aggression precautions.  4) Pertinent medical issues: none  5) Will order following labs: none  6) Will make the following medication changes:   --Cont Zoloft 100 mg daily  --Cont Zyprexa 7.5 mg nightly   --Cont Prazosin 2 mg nightly   7) Will continue discharge planning as appropriate for patient.  8) Psychotherapy provided for 16-37 minutes.  Provided CBT, psychoeducation and insight-oriented therapy.    Treatment plan and medication risks and benefits discussed with: Patient     Psychotherapy Note:   Duration: 32 minutes  Participants: Pt and myself  Topic: Depression, Goal setting, family relationships  Intervention: CBT, psychoeducation, Insight-oriented  Note: Patient was provided with reflective listening and challenge oriented questions regarding thought process and evaluation.  Pt presents with negativity and tangential process.  She is reporting that she is feels family is unsupportive, yet willing to elaborate, she states that she feels hopeless at this point.    Pt is encouraged to work on short-term goals and focus on what she can do now to help her situation.   DX: as above  Plan: Continue to provide therapy  Progress: MIKE Wang  12/22/20  10:36 CST

## 2020-12-22 NOTE — PLAN OF CARE
Goal Outcome Evaluation:  Plan of Care Reviewed With: patient  Progress: no change  Outcome Summary: Patient is withdrawn and does not discuss her feelings of her past

## 2020-12-22 NOTE — PLAN OF CARE
Problem: Social, Occupational or Functional Impairment (Depressive Signs/Symptoms)  Goal: Enhanced Social, Occupational or Functional Skills (Depressive Signs/Symptoms)  Outcome: Ongoing, Progressing  Flowsheets (Taken 12/22/2020 0850)  Mutually Determined Action Steps (Enhanced Social, Occupational or Functional Skills):   identifies personal strengths   identifies support resources   Goal Outcome Evaluation:  Plan of Care Reviewed With: patient  Progress: no change   Met with patient and completed Recreation Therapy Assessment.  Patient was tearful, guarded and and very vague with her answers.  She reports she wants to hear from her family but no one answers when she calls.  She reports that she works but could not identify what time of work she does.  She did not identify any significant leisure interest and states that she just works and takes care of her kids.  She reports that she does  like to walk when it is not cold and go swimming in the summer.  Patient reports feeling confused and states that she wants to leave but she does not know where she would go or how she would get there.  Will continue to encourage group participation and provide opportunities to increase leisure awareness and stress management.

## 2020-12-22 NOTE — PLAN OF CARE
Goal Outcome Evaluation:  Plan of Care Reviewed With: patient  Progress: no change  Outcome Summary: Patient is quiet and withdrawn. Slept well this shift.

## 2020-12-22 NOTE — NURSING NOTE
Behavior   Note any precipitants to event or behavior   Describe level and action of any aggressive behavior or speech and associated interventions.     Anxiety: Excess Worry and Easily fatigued  Depression: depressed mood and hopelessness  Pain  0  AVH   no  S/I   no  Plan  no  H/I   no  Plan  no    Affect   flat      Note: Patient interviewed in room while laying in bed. During interview patient appears disinterested towards staff and questions. Patient answered most questions with I dont know or just shook head. Denies SI/HI/AVH.     Intervention    PRN medication utilized:  no    Instructed in medication usage and effects  Medications administered as ordered  Encouraged to verbalize needs      Response    Verbalized understanding   Did patient take medications as ordered no  Did patient interact with assessment?  no    Plan    Will monitor for safety  Will monitor every 15 minutes as ordered  Will evaluate and promote the plan of care    Last BM:  unknown date  (Please chart in I/O as well)

## 2020-12-22 NOTE — NURSING NOTE
Behavior   Note any precipitants to event or behavior   Describe level and action of any aggressive behavior or speech and associated interventions.     Anxiety: Excess Worry  Depression: depressed mood, feelings of worthlessness/guilt, difficulty concentrating and impaired memory  Pain  0  AVH   no  S/I   no  Plan  no  H/I   no  Plan  no    Affect   flat and tearful      Note:Patient stated that she wanted to find her parents that she has not talked to for several years.  She was tearful, stating that they probably didn't want anything to do with her.  She they changed her mind and stated that never mind, it's too late.  She is very disorganized with her thinking and speech has delayed response.        Intervention    PRN medication utilized:  no    Instructed in medication usage and effects  Medications administered as ordered  Encouraged to verbalize needs      Response    Verbalized understanding   Did patient take medications as ordered yes   Did patient interact with assessment?  yes    Plan    Will monitor for safety  Will monitor every 15 minutes as ordered  Will evaluate and promote the plan of care    Last BM:  unknown date  (Please chart in I/O as well)

## 2020-12-23 LAB
25(OH)D3 SERPL-MCNC: 25.5 NG/ML (ref 30–100)
FOLATE SERPL-MCNC: 8.05 NG/ML (ref 4.78–24.2)
GLUCOSE BLDC GLUCOMTR-MCNC: 104 MG/DL (ref 70–130)
QT INTERVAL: 420 MS
QTC INTERVAL: 415 MS
VIT B12 BLD-MCNC: 318 PG/ML (ref 211–946)

## 2020-12-23 PROCEDURE — 82306 VITAMIN D 25 HYDROXY: CPT | Performed by: PSYCHIATRY & NEUROLOGY

## 2020-12-23 PROCEDURE — 99232 SBSQ HOSP IP/OBS MODERATE 35: CPT | Performed by: PSYCHIATRY & NEUROLOGY

## 2020-12-23 PROCEDURE — 90833 PSYTX W PT W E/M 30 MIN: CPT | Performed by: PSYCHIATRY & NEUROLOGY

## 2020-12-23 PROCEDURE — 82746 ASSAY OF FOLIC ACID SERUM: CPT | Performed by: PSYCHIATRY & NEUROLOGY

## 2020-12-23 PROCEDURE — 82607 VITAMIN B-12: CPT | Performed by: PSYCHIATRY & NEUROLOGY

## 2020-12-23 PROCEDURE — 82962 GLUCOSE BLOOD TEST: CPT

## 2020-12-23 RX ORDER — PRAZOSIN HYDROCHLORIDE 1 MG/1
1 CAPSULE ORAL DAILY
Status: DISCONTINUED | OUTPATIENT
Start: 2020-12-24 | End: 2020-12-26 | Stop reason: HOSPADM

## 2020-12-23 RX ORDER — LURASIDONE HYDROCHLORIDE 40 MG/1
40 TABLET, FILM COATED ORAL
Status: DISCONTINUED | OUTPATIENT
Start: 2020-12-23 | End: 2020-12-24

## 2020-12-23 RX ORDER — METHYLPHENIDATE HYDROCHLORIDE 5 MG/1
5 TABLET ORAL
Status: DISCONTINUED | OUTPATIENT
Start: 2020-12-24 | End: 2020-12-26 | Stop reason: HOSPADM

## 2020-12-23 RX ORDER — PRAZOSIN HYDROCHLORIDE 1 MG/1
3 CAPSULE ORAL NIGHTLY
Status: DISCONTINUED | OUTPATIENT
Start: 2020-12-23 | End: 2020-12-26 | Stop reason: HOSPADM

## 2020-12-23 RX ORDER — MELATONIN
2000 DAILY
Status: DISCONTINUED | OUTPATIENT
Start: 2020-12-23 | End: 2020-12-26 | Stop reason: HOSPADM

## 2020-12-23 RX ADMIN — TRAZODONE HYDROCHLORIDE 50 MG: 50 TABLET ORAL at 20:10

## 2020-12-23 RX ADMIN — PRAZOSIN HYDROCHLORIDE 3 MG: 1 CAPSULE ORAL at 20:05

## 2020-12-23 RX ADMIN — SERTRALINE HYDROCHLORIDE 100 MG: 50 TABLET ORAL at 08:07

## 2020-12-23 RX ADMIN — LURASIDONE HYDROCHLORIDE 40 MG: 40 TABLET, FILM COATED ORAL at 17:56

## 2020-12-23 RX ADMIN — Medication 2000 UNITS: at 17:35

## 2020-12-23 NOTE — NURSING NOTE
"Behavior   Note any precipitants to event or behavior   Describe level and action of any aggressive behavior or speech and associated interventions.     Anxiety: Excess Worry  Depression: depressed mood and difficulty concentrating  Pain  0  AVH   no  S/I   no  Plan  no  H/I   no  Plan  no    Affect   increased in intensity      Note: Patient became very anxious during blood draw this morning. Patient states, \"it felt like everything was closing in on me and I was going to die.\" Staff were present, vital signs taken, fluid intake encouraged. Patient denies any SI/HI/AVH at this time. Patient states she is feeling better but wants to continue sleeping. Patient states she slept well last night, good appetite noted.      Intervention    PRN medication utilized:  no    Instructed in medication usage and effects  Medications administered as ordered  Encouraged to verbalize needs      Response    Verbalized understanding   Did patient take medications as ordered yes   Did patient interact with assessment?  yes     Plan    Will monitor for safety  Will monitor every 15 minutes as ordered  Will evaluate and promote the plan of care    Last BM:  unknown date  (Please chart in I/O as well)    "

## 2020-12-23 NOTE — PROGRESS NOTES
"12/23/2020    Chief Complaint: depression    Subjective:  Patient is a 35 y.o. female that is currently inpatient on the adult U today she is seen in the conference room. When asked what name preference pt responded \"I don't care what you call me\" and walked past.  She sat in chair with legs up to chest, and at first gave short/magui answer.   Pt does become less tense and begin to open up more, she describes her life in chaos right now, states she doesn't know what to do, feels family has given up on her and is tired of her always being \"needy\"  Pt does not answer when asked if parents are alive she states \"I don't even know that is something I was to talk about\"  When asked about sister she says she isn't supportive but then she has pts ID's and other important papers she would need.   When asked about a job pt states \"that's a trick question, you'll think I'm nuts if I answer either way\"   She then reports she has a car that she needs to make a payment on the 25th but then states she doesn't know if it even runs.   Pt continues to remain negative about everything in her life at this time, children included.   Pt is encouraged to work on short term goals, she then expresses need for housing.   Pt did report she slept well last night, no SI/HI/AVH.  No issues with medications    Objective     Vital Signs    Temp:  [97.4 °F (36.3 °C)-98.6 °F (37 °C)] 97.4 °F (36.3 °C)  Heart Rate:  [] 58  Resp:  [18-22] 20  BP: ()/(63-67) 120/67    Physical Exam:   General Appearance: alert, appears stated age and cooperative,  Hygiene:   fair  Gait & Station: Normal  Musculoskeletal: No tremors or abnormal involuntary movements    Mental Status Exam:   Cooperation:  Cooperative  Eye Contact:  Fair  Psychomotor Behavior:  restless at times during conversation  Mood: Irritable and Sad/Depressed  Affect:  constricted  Speech:  Normal  Thought Process:  vague and lacking in detail and linearity  Associations: Would start " talking about something would stop mid idea due to frustration.  She appears for have a sense of futility about anything improving from her effort to communicate the idea.  Thought Content:     Mood congruent   Suicidal:  Nihilistic and feelings of futility   Homicidal:  None   Hallucinations:  None   Delusion:  Paranoid  Cognitive Functioning:   Consciousness: awake, alert and oriented  Reliability:  adequate  Insight:  limited  Judgement:  Impaired  Impulse Control:  Fair    Lab Results (last 24 hours)     Procedure Component Value Units Date/Time    Vitamin D 25 Hydroxy [[id number]]  (Abnormal) Collected: 12/23/20 0626    Specimen: Blood Updated: 12/23/20 1225     25 Hydroxy, Vitamin D 25.5 ng/ml     Narrative:      Reference Range for Total Vitamin D 25(OH)     Deficiency <20.0 ng/mL   Insufficiency 21-29 ng/mL   Sufficiency  ng/mL  Toxicity >100 ng/ml    Results may be falsely increased if patient taking Biotin.      Vitamin B12 [[id number]]  (Normal) Collected: 12/23/20 0626    Specimen: Blood Updated: 12/23/20 1225     Vitamin B-12 318 pg/mL     Narrative:      Results may be falsely increased if patient taking Biotin.      Folate [[id number]]  (Normal) Collected: 12/23/20 0626    Specimen: Blood Updated: 12/23/20 1225     Folate 8.05 ng/mL     Narrative:      Results may be falsely increased if patient taking Biotin.      POC Glucose Once [[id number]]  (Normal) Collected: 12/23/20 0629    Specimen: Blood Updated: 12/23/20 0643     Glucose 104 mg/dL      Comment: : [id number] BINTA THRASHERMeter ID: [id number]           Imaging Results (Last 24 Hours)     ** No results found for the last 24 hours. **          Medicine:   Current Facility-Administered Medications:   •  acetaminophen (TYLENOL) tablet 650 mg, 650 mg, Oral, Q4H PRN, Prema Horn MD, 650 mg at 12/22/20 1400  •  albuterol (PROVENTIL) nebulizer solution 0.083% 2.5 mg/3mL, 2.5 mg, Nebulization, Q6H PRN, Prema Horn MD  •   aluminum-magnesium hydroxide-simethicone (MAALOX MAX) 400-400-40 MG/5ML suspension 15 mL, 15 mL, Oral, Q6H PRN, Prema Horn MD  •  cloNIDine (CATAPRES) tablet 0.1 mg, 0.1 mg, Oral, Q4H PRN, Prema Horn MD  •  OLANZapine zydis (zyPREXA) disintegrating tablet 10 mg, 10 mg, Oral, Q8H PRN **OR** haloperidol lactate (HALDOL) injection 5 mg, 5 mg, Intramuscular, Q8H PRN, Prema Horn MD  •  hydrOXYzine pamoate (VISTARIL) capsule 50 mg, 50 mg, Oral, Q6H PRN, Prema Horn MD  •  loperamide (IMODIUM) capsule 2 mg, 2 mg, Oral, Q2H PRN, Prema Horn MD  •  magnesium hydroxide (MILK OF MAGNESIA) suspension 2400 mg/10mL 10 mL, 10 mL, Oral, Daily PRN, Prema Horn MD  •  nicotine polacrilex (NICORETTE) gum 2 mg, 2 mg, Mouth/Throat, Q1H PRN, Prema Horn MD  •  OLANZapine (zyPREXA) tablet 7.5 mg, 7.5 mg, Oral, Nightly, Prema Horn MD, 7.5 mg at 12/22/20 2007  •  ondansetron (ZOFRAN) tablet 4 mg, 4 mg, Oral, Q6H PRN, Prema Horn MD  •  prazosin (MINIPRESS) capsule 2 mg, 2 mg, Oral, Nightly, Prema Horn MD, 2 mg at 12/22/20 2007  •  [COMPLETED] sertraline (ZOLOFT) tablet 50 mg, 50 mg, Oral, Daily, 50 mg at 12/21/20 1511 **FOLLOWED BY** sertraline (ZOLOFT) tablet 100 mg, 100 mg, Oral, Daily, Prema Horn MD, 100 mg at 12/23/20 0807  •  traZODone (DESYREL) tablet 50 mg, 50 mg, Oral, Nightly PRN, Aleksandar Hornbeer, MD    Diagnoses/Assessment:     Severe episode of recurrent major depressive disorder, with psychotic features (CMS/HCC)    Post traumatic stress disorder (PTSD)      Treatment Plan:    1) Will continue care for the patient on the behavioral health unit at [facility] to ensure patient safety.  2) Will continue to provide treatment with the unit milieu, activities, therapies and psychopharmacological management.  3) Patient to be placed on or continued on  Q15 minute checks  and Suicide and Aggression precautions.  4)  Pertinent medical issues:   --low vit d: supplement  5) Will order following labs: reviewed  6) Will make the following medication changes:   --Cont Zoloft 100 mg daily  --Stop Zyprexa 7.5 mg nightly due to weight gain concerns  --Start Latuda 40mg with dinner  --Increase Prazosin to 1mg qam and 3 mg nightly   7) Will continue discharge planning as appropriate for patient.    Treatment plan and medication risks and benefits discussed with: Patient     Psychotherapy Note  --Total Psychotherapy Time: 20 minutes  --Participants: Patient, myself  --Focus of Therapeutic Encounter: anxiety and distress  --Intervention Type: Supportive and CBT/cognitive  --Therapy notes: I provided reflective listening, supportive therapy, reflection, and allowed them to express affect in therapy course.  Discussed her concerns about family not caring and discussed her feelings of hopelessness.  --DX: as above  --Plan: Continue to work on developing & strengthening coping skills; correcting maladaptive schema;   --Post therapy status: improving affect      Prema Horn MD  12/23/20  16:52 CST

## 2020-12-23 NOTE — PLAN OF CARE
Goal Outcome Evaluation:  Plan of Care Reviewed With: patient  Progress: no change  Outcome Summary: Patient has slept approx 7 hours at this time. No changes overnight, no needs voiced.

## 2020-12-23 NOTE — PLAN OF CARE
"Goal Outcome Evaluation:  Plan of Care Reviewed With: patient  Progress: improving  Outcome Summary: Patient became very anxious during blood draw this morning. Patient states, \"it felt like everything was closing in on me and I was going to die.\" Staff were present, vital signs taken, fluid intake encouraged. Patient denies any SI/HI/AVH at this time. Patient states she is feeling better but wants to continue sleeping. Patient states she slept well last night, good appetite noted.  "

## 2020-12-23 NOTE — NURSING NOTE
"Behavior   Note any precipitants to event or behavior   Describe level and action of any aggressive behavior or speech and associated interventions.     Anxiety: Restless/Edgy and Decreased concentration  Depression: difficulty concentrating, hopelessness and decreased appetite  Pain  0  AVH   no  S/I   no  Plan  no  H/I   no  Plan  no    Affect   blunted      Note: Patient in room at time of assessment. Denies SI/AVH at this time. Denies depression, states \"I dont think my anxiety will ever go away, its just too much to talk about\". Patient was willing to come out and try to eat a snack and she participated in group. Calm and pleasant with staff. Remains withdrawn from peers. Took scheduled meds as ordered.       Intervention    PRN medication utilized:  no    Instructed in medication usage and effects  Medications administered as ordered  Encouraged to verbalize needs      Response    Verbalized understanding   Did patient take medications as ordered yes   Did patient interact with assessment?  yes     Plan    Will monitor for safety  Will monitor every 15 minutes as ordered  Will evaluate and promote the plan of care    Last BM:  unknown date  (Please chart in I/O as well)  "

## 2020-12-24 PROCEDURE — 99232 SBSQ HOSP IP/OBS MODERATE 35: CPT | Performed by: NURSE PRACTITIONER

## 2020-12-24 RX ADMIN — PRAZOSIN HYDROCHLORIDE 3 MG: 1 CAPSULE ORAL at 20:14

## 2020-12-24 RX ADMIN — METHYLPHENIDATE HYDROCHLORIDE 5 MG: 5 TABLET ORAL at 13:08

## 2020-12-24 RX ADMIN — TRAZODONE HYDROCHLORIDE 50 MG: 50 TABLET ORAL at 21:38

## 2020-12-24 RX ADMIN — PRAZOSIN HYDROCHLORIDE 1 MG: 1 CAPSULE ORAL at 08:06

## 2020-12-24 RX ADMIN — SERTRALINE HYDROCHLORIDE 100 MG: 50 TABLET ORAL at 08:06

## 2020-12-24 RX ADMIN — Medication 2000 UNITS: at 08:06

## 2020-12-24 RX ADMIN — LURASIDONE HYDROCHLORIDE 60 MG: 40 TABLET, FILM COATED ORAL at 17:15

## 2020-12-24 RX ADMIN — METHYLPHENIDATE HYDROCHLORIDE 5 MG: 5 TABLET ORAL at 08:06

## 2020-12-24 RX ADMIN — ACETAMINOPHEN 650 MG: 325 TABLET, FILM COATED ORAL at 21:38

## 2020-12-24 NOTE — PROGRESS NOTES
12/24/2020    Chief Complaint: anxiety and depression    Subjective:  Patient is a 35 y.o. female that is currently inpatient on the adult U today she is seen in the interview room, she is alert/oriented and reality based.   Pt's mood is much improved from admission, she reports feeling more responsible for her situation and that she is hoping to make amends with family and have relationships.   Pt states she slept well last night, no issues with medications.    Pt is forward goal focused and not resonating on negative thoughts.   Pt states she is going to reach out to call sister and daughter today  She denies SI/HI/AVH    Objective     Vital Signs    Temp:  [97.2 °F (36.2 °C)-97.9 °F (36.6 °C)] 97.2 °F (36.2 °C)  Heart Rate:  [65-83] 83  Resp:  [18] 18  BP: (126-153)/(72-90) 126/72    Physical Exam:   General Appearance: alert, appears stated age and cooperative,  Hygiene:   fair  Gait & Station: Normal  Musculoskeletal: No tremors or abnormal involuntary movements    Mental Status Exam:   Cooperation:  Cooperative  Eye Contact:  Good  Psychomotor Behavior:  Appropriate  Mood: Improving  Affect:  normal  Speech:  Normal  Thought Process:  Coherent  Associations: Goal Directed  Thought Content:     Mood congruent   Suicidal:  None   Homicidal:  None   Hallucinations:  None   Delusion:  None  Cognitive Functioning:   Consciousness: awake, alert and oriented  Reliability:  fair  Insight:  Fair  Judgement:  improving  Impulse Control:  Fair    Lab Results (last 24 hours)     Procedure Component Value Units Date/Time    Vitamin D 25 Hydroxy [[id number]]  (Abnormal) Collected: 12/23/20 0626    Specimen: Blood Updated: 12/23/20 1225     25 Hydroxy, Vitamin D 25.5 ng/ml     Narrative:      Reference Range for Total Vitamin D 25(OH)     Deficiency <20.0 ng/mL   Insufficiency 21-29 ng/mL   Sufficiency  ng/mL  Toxicity >100 ng/ml    Results may be falsely increased if patient taking Biotin.      Vitamin B12  [[id number]]  (Normal) Collected: 12/23/20 0626    Specimen: Blood Updated: 12/23/20 1225     Vitamin B-12 318 pg/mL     Narrative:      Results may be falsely increased if patient taking Biotin.      Folate [[id number]]  (Normal) Collected: 12/23/20 0626    Specimen: Blood Updated: 12/23/20 1225     Folate 8.05 ng/mL     Narrative:      Results may be falsely increased if patient taking Biotin.          Imaging Results (Last 24 Hours)     ** No results found for the last 24 hours. **          Medicine:   Current Facility-Administered Medications:   •  acetaminophen (TYLENOL) tablet 650 mg, 650 mg, Oral, Q4H PRN, Prema Horn MD, 650 mg at 12/22/20 1400  •  albuterol (PROVENTIL) nebulizer solution 0.083% 2.5 mg/3mL, 2.5 mg, Nebulization, Q6H PRN, Prema Horn MD  •  aluminum-magnesium hydroxide-simethicone (MAALOX MAX) 400-400-40 MG/5ML suspension 15 mL, 15 mL, Oral, Q6H PRN, Prema Horn MD  •  cholecalciferol (VITAMIN D3) tablet 2,000 Units, 2,000 Units, Oral, Daily, Prema Horn MD, 2,000 Units at 12/24/20 0806  •  cloNIDine (CATAPRES) tablet 0.1 mg, 0.1 mg, Oral, Q4H PRN, Prema Horn MD  •  OLANZapine zydis (zyPREXA) disintegrating tablet 10 mg, 10 mg, Oral, Q8H PRN **OR** haloperidol lactate (HALDOL) injection 5 mg, 5 mg, Intramuscular, Q8H PRN, Prema Horn MD  •  hydrOXYzine pamoate (VISTARIL) capsule 50 mg, 50 mg, Oral, Q6H PRN, Prema Horn MD  •  loperamide (IMODIUM) capsule 2 mg, 2 mg, Oral, Q2H PRN, Prema Horn MD  •  lurasidone (LATUDA) tablet 40 mg, 40 mg, Oral, Daily With Dinner, Prema Horn MD, 40 mg at 12/23/20 1756  •  magnesium hydroxide (MILK OF MAGNESIA) suspension 2400 mg/10mL 10 mL, 10 mL, Oral, Daily PRN, Prema Horn MD  •  methylphenidate (RITALIN) tablet 5 mg, 5 mg, Oral, Daily With Breakfast & Lunch, Prema Horn MD, 5 mg at 12/24/20 0806  •  nicotine polacrilex (NICORETTE) gum 2 mg, 2 mg, Mouth/Throat, Q1H PRN,  Prema Horn MD  •  ondansetron (ZOFRAN) tablet 4 mg, 4 mg, Oral, Q6H PRN, Prema Horn MD  •  prazosin (MINIPRESS) capsule 1 mg, 1 mg, Oral, Daily, Prema Horn MD, 1 mg at 12/24/20 0806  •  prazosin (MINIPRESS) capsule 3 mg, 3 mg, Oral, Nightly, Prema Horn MD, 3 mg at 12/23/20 2005  •  [COMPLETED] sertraline (ZOLOFT) tablet 50 mg, 50 mg, Oral, Daily, 50 mg at 12/21/20 1511 **FOLLOWED BY** sertraline (ZOLOFT) tablet 100 mg, 100 mg, Oral, Daily, Prema Horn MD, 100 mg at 12/24/20 0806  •  traZODone (DESYREL) tablet 50 mg, 50 mg, Oral, Nightly PRN, Prema Horn MD, 50 mg at 12/23/20 2010    Diagnoses/Assessment:     Severe episode of recurrent major depressive disorder, with psychotic features (CMS/HCC)    Post traumatic stress disorder (PTSD)      Treatment Plan:    1) Will continue care for the patient on the behavioral health unit at [facility] to ensure patient safety.  2) Will continue to provide treatment with the unit milieu, activities, therapies and psychopharmacological management.  3) Patient to be placed on or continued on  Q15 minute checks  and Suicide and Aggression precautions.  4) Pertinent medical issues:   --Low Vit D Supplement  5) Will order following labs: None  6) Will make the following medication changes:   --Cont Zoloft 100 mg daily  --Increase Latuda to 60 mg with dinner  --Cont Ritalin 5 mg with breakfast and lunch  --Cont Prazosin 1 mg daily and 3 mg QHS    7) Will continue discharge planning as appropriate for patient.  8) Psychotherapy provided for less than 16 minutes.    Treatment plan and medication risks and benefits discussed with: Patient    Rosanna E Swati, MIKE  12/24/20  11:41 CST

## 2020-12-24 NOTE — PLAN OF CARE
Goal Outcome Evaluation:  Plan of Care Reviewed With: patient  Progress: improving  Outcome Summary: Patient has been more engaged today. Patient appears calm and interacts with staff and peers. Patient is organized with thoughts and able to voice needs appropriately. Good appetite noted.

## 2020-12-24 NOTE — NURSING NOTE
Behavior   Note any precipitants to event or behavior   Describe level and action of any aggressive behavior or speech and associated interventions.     Anxiety: Excess Worry  Depression: depressed mood, difficulty concentrating  Pain  0  AVH   no  S/I   no  Plan  no  H/I   no  Plan  no    Affect   blunted      Note:Pt was on the fermin during assessment, patient is very quiet and keeps to self, able to make needs known, denies SI/HI/AVH was out on the fermin eating a snack. Will continue to monitor.      Intervention    PRN medication utilized:  no    Instructed in medication usage and effects  Medications administered as ordered  Encouraged to verbalize needs      Response    Verbalized understanding   Did patient take medications as ordered yes   Did patient interact with assessment?  yes     Plan    Will monitor for safety  Will monitor every 15 minutes as ordered  Will evaluate and promote the plan of care    Last BM:  unknown date  (Please chart in I/O as well)

## 2020-12-24 NOTE — PLAN OF CARE
Goal Outcome Evaluation:  Plan of Care Reviewed With: patient  Progress: no change  Outcome Summary: Has been sleeping throughout the night, no behaviors, able to make needs known, no compliants or concerns voiced

## 2020-12-25 VITALS
OXYGEN SATURATION: 94 % | SYSTOLIC BLOOD PRESSURE: 136 MMHG | WEIGHT: 173.28 LBS | DIASTOLIC BLOOD PRESSURE: 77 MMHG | HEART RATE: 87 BPM | RESPIRATION RATE: 18 BRPM | BODY MASS INDEX: 27.85 KG/M2 | TEMPERATURE: 97 F | HEIGHT: 66 IN

## 2020-12-25 PROBLEM — F90.2 ATTENTION DEFICIT HYPERACTIVITY DISORDER (ADHD), COMBINED TYPE: Status: ACTIVE | Noted: 2020-12-25

## 2020-12-25 PROCEDURE — 99239 HOSP IP/OBS DSCHRG MGMT >30: CPT | Performed by: PSYCHIATRY & NEUROLOGY

## 2020-12-25 RX ORDER — LURASIDONE HYDROCHLORIDE 60 MG/1
60 TABLET, FILM COATED ORAL
Qty: 30 TABLET | Refills: 0 | Status: SHIPPED | OUTPATIENT
Start: 2020-12-25

## 2020-12-25 RX ORDER — SERTRALINE HYDROCHLORIDE 100 MG/1
100 TABLET, FILM COATED ORAL DAILY
Qty: 30 TABLET | Refills: 0 | Status: SHIPPED | OUTPATIENT
Start: 2020-12-26

## 2020-12-25 RX ORDER — MELATONIN
2000 DAILY
Qty: 60 TABLET | Refills: 1 | Status: SHIPPED | OUTPATIENT
Start: 2020-12-26

## 2020-12-25 RX ORDER — METHYLPHENIDATE HYDROCHLORIDE 5 MG/1
5 TABLET ORAL
Qty: 60 TABLET | Refills: 0 | Status: SHIPPED | OUTPATIENT
Start: 2020-12-26

## 2020-12-25 RX ORDER — TRAZODONE HYDROCHLORIDE 50 MG/1
50 TABLET ORAL NIGHTLY PRN
Qty: 30 TABLET | Refills: 0 | Status: SHIPPED | OUTPATIENT
Start: 2020-12-25

## 2020-12-25 RX ORDER — PRAZOSIN HYDROCHLORIDE 1 MG/1
CAPSULE ORAL
Qty: 120 CAPSULE | Refills: 0 | Status: SHIPPED | OUTPATIENT
Start: 2020-12-25

## 2020-12-25 RX ADMIN — Medication 2000 UNITS: at 08:15

## 2020-12-25 RX ADMIN — LURASIDONE HYDROCHLORIDE 60 MG: 40 TABLET, FILM COATED ORAL at 18:44

## 2020-12-25 RX ADMIN — PRAZOSIN HYDROCHLORIDE 3 MG: 1 CAPSULE ORAL at 20:12

## 2020-12-25 RX ADMIN — METHYLPHENIDATE HYDROCHLORIDE 5 MG: 5 TABLET ORAL at 08:15

## 2020-12-25 RX ADMIN — METHYLPHENIDATE HYDROCHLORIDE 5 MG: 5 TABLET ORAL at 12:09

## 2020-12-25 RX ADMIN — SERTRALINE HYDROCHLORIDE 100 MG: 50 TABLET ORAL at 08:15

## 2020-12-25 RX ADMIN — PRAZOSIN HYDROCHLORIDE 1 MG: 1 CAPSULE ORAL at 08:15
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
## 2020-12-25 NOTE — NURSING NOTE
"Behavior   Note any precipitants to event or behavior   Describe level and action of any aggressive behavior or speech and associated interventions.     Anxiety: Excess Worry and Decreased concentration  Depression: Patient denies at this time  Pain  0  AVH   no  S/I   no  Plan  no  H/I   no  Plan  no    Affect   blunted      Note: Patient sitting in hallway at time of assessment. Calm, cooperative, and pleasant with staff. Took scheduled meds as ordered. States that she had a good day and that \"my anxiety still sucks, but it is getting better\". Denies SI/HI/AVH.         Intervention    PRN medication utilized:  no    Instructed in medication usage and effects  Medications administered as ordered  Encouraged to verbalize needs      Response    Verbalized understanding   Did patient take medications as ordered yes   Did patient interact with assessment?  yes     Plan    Will monitor for safety  Will monitor every 15 minutes as ordered  Will evaluate and promote the plan of care    Last BM:  unknown date  (Please chart in I/O as well)  "

## 2020-12-25 NOTE — NURSING NOTE
Behavior   Note any precipitants to event or behavior   Describe level and action of any aggressive behavior or speech and associated interventions.     Anxiety: Patient denies at this time  Depression: depressed mood  Pain  0  AVH   no  S/I   no  Plan  no  H/I   no  Plan  no    Affect   blunted      Note:Patient stated that she is depressed bit much better.  She stated that the ritalin has improved her ability to concentrate,  She denies SI/HI and hallucinations.  She is calm and cooperative       Intervention    PRN medication utilized:  no    Instructed in medication usage and effects  Medications administered as ordered  Encouraged to verbalize needs      Response    Verbalized understanding   Did patient take medications as ordered yes   Did patient interact with assessment?  yes     Plan    Will monitor for safety  Will monitor every 15 minutes as ordered  Will evaluate and promote the plan of care    Last BM:  unknown date  (Please chart in I/O as well)

## 2020-12-25 NOTE — PLAN OF CARE
Goal Outcome Evaluation:  Plan of Care Reviewed With: patient  Progress: improving  Outcome Summary: Patient has slept approx 7 hours. Had PRN tylenol for headache and it was effective.

## 2020-12-25 NOTE — NURSING NOTE
Patient calm and cooperative, stated that she feels better today.  Stated that she hopes to go home today.

## 2020-12-26 NOTE — DISCHARGE INSTRUCTIONS
--Continue medications as currently prescribed.  --Continue follow-up with outpatient providers.  --Please contact our Behavioral Health Unit with any questions or concerns at [phone].  --Return to the ER with any suicidal or homicidal ideation, or worsening symptoms.    --The number for the National Suicide & Crisis Hotline is 1-720.193.7232.  The National Crisis Text number is 060764.  These may be contacted at any time, if needed.

## 2020-12-26 NOTE — PLAN OF CARE
Goal Outcome Evaluation:  Plan of Care Reviewed With: patient  Progress: improving  Patient adequate for transfer

## 2020-12-26 NOTE — NURSING NOTE
Behavior   Note any precipitants to event or behavior   Describe level and action of any aggressive behavior or speech and associated interventions.     Anxiety: Patient denies at this time  Depression: depressed mood  Pain  0  AVH   no  S/I   no  Plan  no  H/I   no  Plan  no    Affect   flat and blunted      Note: Patient was out in dining area talking with peers. She is calm and cooperative. She denies SI/HI/AVH. Will continue to monitor patient behavior.       Intervention    PRN medication utilized:  no    Instructed in medication usage and effects  Medications administered as ordered  Encouraged to verbalize needs      Response    Verbalized understanding   Did patient take medications as ordered yes   Did patient interact with assessment?  yes     Plan    Will monitor for safety  Will monitor every 15 minutes as ordered  Will evaluate and promote the plan of care    Last BM:  unknown date  (Please chart in I/O as well)